# Patient Record
Sex: MALE | Race: OTHER | Employment: FULL TIME | URBAN - METROPOLITAN AREA
[De-identification: names, ages, dates, MRNs, and addresses within clinical notes are randomized per-mention and may not be internally consistent; named-entity substitution may affect disease eponyms.]

---

## 2024-02-14 ENCOUNTER — OFFICE VISIT (OUTPATIENT)
Dept: ENT CLINIC | Age: 29
End: 2024-02-14
Payer: COMMERCIAL

## 2024-02-14 VITALS
WEIGHT: 154 LBS | BODY MASS INDEX: 17.82 KG/M2 | DIASTOLIC BLOOD PRESSURE: 68 MMHG | HEIGHT: 78 IN | SYSTOLIC BLOOD PRESSURE: 100 MMHG

## 2024-02-14 DIAGNOSIS — H61.23 EXCESSIVE CERUMEN IN EAR CANAL, BILATERAL: Primary | ICD-10-CM

## 2024-02-14 DIAGNOSIS — J35.1 TONSILLAR HYPERTROPHY: ICD-10-CM

## 2024-02-14 DIAGNOSIS — H92.03 OTALGIA, BILATERAL: ICD-10-CM

## 2024-02-14 DIAGNOSIS — G47.33 OSA (OBSTRUCTIVE SLEEP APNEA): ICD-10-CM

## 2024-02-14 PROCEDURE — 99213 OFFICE O/P EST LOW 20 MIN: CPT | Performed by: STUDENT IN AN ORGANIZED HEALTH CARE EDUCATION/TRAINING PROGRAM

## 2024-02-14 PROCEDURE — 69210 REMOVE IMPACTED EAR WAX UNI: CPT | Performed by: STUDENT IN AN ORGANIZED HEALTH CARE EDUCATION/TRAINING PROGRAM

## 2024-02-14 NOTE — PROGRESS NOTES
[facility] Office Note    Patient: Amilcar Calixto  MRN: [id number]  : 1995  Gender:  male  Vital Signs: /68 (Site: Left Upper Arm, Position: Sitting)   Ht 1.981 m (6' 6\")   Wt 69.9 kg (154 lb)   BMI 17.80 kg/m²   Date: 2024    CC:   Chief Complaint   Patient presents with    Follow-up     Ear pain        HPI:  Amilcar Calixto is a 28 y.o. male here for evaluation of bilateral otalgia.  He gets cerumen impactions.  No significant hearing changes.  He also snores.  He has enlarged tonsils.  He endorses getting an episode of tonsillitis or pharyngitis once or twice in the winter each year.  He was reportedly recommended for tonsillectomy in the past but never had this done.  He denies having had a sleep study.    Past Medical History, Past Surgical History, Family history, Social History, and Medications were all reviewed with the patient today and updated as necessary.     Allergies   Allergen Reactions    Penicillins Anaphylaxis     There is no problem list on file for this patient.    No current outpatient medications on file.     No current facility-administered medications for this visit.     History reviewed. No pertinent past medical history.  Social History     Tobacco Use    Smoking status: Never    Smokeless tobacco: Never   Substance Use Topics    Alcohol use: Never     History reviewed. No pertinent surgical history.  History reviewed. No pertinent family history.     ROS:    Review of Systems   Constitutional:  Negative for activity change and appetite change.   HENT:  Positive for ear pain. Negative for congestion and ear discharge.    Eyes:  Negative for redness and itching.   Respiratory:  Negative for shortness of breath.    Cardiovascular:  Negative for chest pain.   Gastrointestinal:  Negative for vomiting.   Endocrine: Negative for cold intolerance and heat intolerance.   Allergic/Immunologic: Negative for environmental allergies.   Neurological:

## 2024-12-09 ENCOUNTER — OFFICE VISIT (OUTPATIENT)
Dept: ENT CLINIC | Age: 29
End: 2024-12-09
Payer: COMMERCIAL

## 2024-12-09 VITALS — HEIGHT: 78 IN | RESPIRATION RATE: 16 BRPM | BODY MASS INDEX: 17.47 KG/M2 | WEIGHT: 151 LBS

## 2024-12-09 DIAGNOSIS — J35.1 TONSILLAR HYPERTROPHY: ICD-10-CM

## 2024-12-09 DIAGNOSIS — K13.70 ORAL LESION: ICD-10-CM

## 2024-12-09 DIAGNOSIS — H61.23 EXCESSIVE CERUMEN IN EAR CANAL, BILATERAL: Primary | ICD-10-CM

## 2024-12-09 PROCEDURE — 99214 OFFICE O/P EST MOD 30 MIN: CPT | Performed by: STUDENT IN AN ORGANIZED HEALTH CARE EDUCATION/TRAINING PROGRAM

## 2024-12-09 PROCEDURE — 69210 REMOVE IMPACTED EAR WAX UNI: CPT | Performed by: STUDENT IN AN ORGANIZED HEALTH CARE EDUCATION/TRAINING PROGRAM

## 2024-12-09 RX ORDER — LIDOCAINE HYDROCHLORIDE 20 MG/ML
5 SOLUTION OROPHARYNGEAL
COMMUNITY
Start: 2024-12-04

## 2024-12-09 RX ORDER — OMEGA-3-ACID ETHYL ESTERS 1 G/1
2 CAPSULE, LIQUID FILLED ORAL
COMMUNITY

## 2024-12-09 RX ORDER — CALCIUM CARBONATE 750 MG/1
2 TABLET, CHEWABLE ORAL 4 TIMES DAILY PRN
COMMUNITY

## 2024-12-09 RX ORDER — CREATINE 100 %
POWDER (GRAM) MISCELLANEOUS
COMMUNITY

## 2024-12-09 ASSESSMENT — ENCOUNTER SYMPTOMS
SINUS PRESSURE: 0
EYE PAIN: 0
FACIAL SWELLING: 0
DIARRHEA: 0
WHEEZING: 0
NAUSEA: 0
COUGH: 0
EYE DISCHARGE: 0
APNEA: 0
CHOKING: 0
CONSTIPATION: 0
STRIDOR: 0
SHORTNESS OF BREATH: 0
EYE ITCHING: 0
SINUS PAIN: 0

## 2024-12-09 NOTE — PROGRESS NOTES
[facility] Office Note    Patient: Amilcar Calixto  MRN: [id number]  : 1995  Gender:  male  Vital Signs: Resp 16   Ht 1.981 m (6' 6\")   Wt 68.5 kg (151 lb)   BMI 17.45 kg/m²   Date: 2024    CC:   Chief Complaint   Patient presents with    Other     Ear cleaning and small bump on right side of tongue that he is concerned with.        HPI:  Amilcar Calixto is a 29 y.o. male who have seen in the past for cerumen impactions.  He endorses a small bump to the left lateral tongue this been present for a month or 2.  He denies any trauma to the area.  He denies any pain or bleeding.  No size changes since this small bump has developed.    Past Medical History, Past Surgical History, Family history, Social History, and Medications were all reviewed with the patient today and updated as necessary.     Allergies   Allergen Reactions    Penicillins Anaphylaxis     There is no problem list on file for this patient.    Current Outpatient Medications   Medication Sig    calcium carbonate (TUMS EX) 750 MG chewable tablet 2 tablets 4 times daily as needed    Creatine POWD by Other route    lidocaine viscous hcl (XYLOCAINE) 2 % SOLN solution 5 mLs every 3 hours as needed    omega-3 acid ethyl esters (LOVAZA) 1 g capsule Take 2 capsules by mouth    omeprazole (PRILOSEC) 20 MG delayed release capsule TAKE 1 CAPSULE (20 MG) BY MOUTH DAILY FOR 14 DAYS     No current facility-administered medications for this visit.     History reviewed. No pertinent past medical history.  Social History     Tobacco Use    Smoking status: Never    Smokeless tobacco: Never   Substance Use Topics    Alcohol use: Never     History reviewed. No pertinent surgical history.  History reviewed. No pertinent family history.     ROS:    Review of Systems   Constitutional:  Negative for chills and fever.   HENT:  Negative for congestion, facial swelling, hearing loss, nosebleeds, sinus pressure, sinus pain and sneezing.

## 2025-05-15 ENCOUNTER — OFFICE VISIT (OUTPATIENT)
Dept: ENT CLINIC | Age: 30
End: 2025-05-15
Payer: COMMERCIAL

## 2025-05-15 VITALS — HEIGHT: 78 IN | BODY MASS INDEX: 17.47 KG/M2 | WEIGHT: 151 LBS | RESPIRATION RATE: 17 BRPM

## 2025-05-15 DIAGNOSIS — G47.33 OSA (OBSTRUCTIVE SLEEP APNEA): ICD-10-CM

## 2025-05-15 DIAGNOSIS — J35.1 TONSILLAR HYPERTROPHY: ICD-10-CM

## 2025-05-15 DIAGNOSIS — H61.23 EXCESSIVE CERUMEN IN EAR CANAL, BILATERAL: Primary | ICD-10-CM

## 2025-05-15 DIAGNOSIS — K13.70 ORAL LESION: ICD-10-CM

## 2025-05-15 PROCEDURE — 99213 OFFICE O/P EST LOW 20 MIN: CPT | Performed by: STUDENT IN AN ORGANIZED HEALTH CARE EDUCATION/TRAINING PROGRAM

## 2025-05-15 PROCEDURE — 69210 REMOVE IMPACTED EAR WAX UNI: CPT | Performed by: STUDENT IN AN ORGANIZED HEALTH CARE EDUCATION/TRAINING PROGRAM

## 2025-05-15 RX ORDER — MUPIROCIN 20 MG/G
1 OINTMENT TOPICAL 3 TIMES DAILY
COMMUNITY
Start: 2025-04-18

## 2025-05-15 RX ORDER — BUSPIRONE HYDROCHLORIDE 10 MG/1
10 TABLET ORAL 2 TIMES DAILY
COMMUNITY
Start: 2025-04-18

## 2025-05-15 ASSESSMENT — ENCOUNTER SYMPTOMS
FACIAL SWELLING: 0
CONSTIPATION: 0
APNEA: 0
SINUS PAIN: 0
EYE ITCHING: 0
SHORTNESS OF BREATH: 0
CHOKING: 0
NAUSEA: 0
COUGH: 0
SINUS PRESSURE: 0
EYE PAIN: 0
DIARRHEA: 0
EYE DISCHARGE: 0
STRIDOR: 0
WHEEZING: 0

## 2025-05-15 NOTE — PROGRESS NOTES
[facility] Office Note    Patient: Amilcar Calixto  MRN: [id number]  : 1995  Gender:  male  Vital Signs: Resp 17   Ht 1.981 m (6' 6\")   Wt 68.5 kg (151 lb)   BMI 17.45 kg/m²   Date: 5/15/2025    CC:   Chief Complaint   Patient presents with    Other     Ear cleaning , pt also states the spot on his tongue that he discussed at last visit is still there.        HPI:  Amilcar Calixto is a 29 y.o. male who have seen in the past for cerumen impactions. He endorses a small bump to the left lateral tongue this been present for a month or 2. He denies any trauma to the area. He denies any pain or bleeding. No size changes since this small bump has developed.  Sleep study had been ordered but not performed.    Past Medical History, Past Surgical History, Family history, Social History, and Medications were all reviewed with the patient today and updated as necessary.     Allergies   Allergen Reactions    Penicillins Anaphylaxis     There is no problem list on file for this patient.    Current Outpatient Medications   Medication Sig    busPIRone (BUSPAR) 10 MG tablet Take 1 tablet by mouth 2 times daily    mupirocin (BACTROBAN) 2 % ointment Apply 1 Application topically 3 times daily    calcium carbonate (TUMS EX) 750 MG chewable tablet 2 tablets 4 times daily as needed    Creatine POWD by Other route    lidocaine viscous hcl (XYLOCAINE) 2 % SOLN solution 5 mLs every 3 hours as needed    omega-3 acid ethyl esters (LOVAZA) 1 g capsule Take 2 capsules by mouth    omeprazole (PRILOSEC) 20 MG delayed release capsule TAKE 1 CAPSULE (20 MG) BY MOUTH DAILY FOR 14 DAYS     No current facility-administered medications for this visit.     History reviewed. No pertinent past medical history.  Social History     Tobacco Use    Smoking status: Never    Smokeless tobacco: Never   Substance Use Topics    Alcohol use: Never     History reviewed. No pertinent surgical history.  History reviewed. No